# Patient Record
Sex: MALE | Race: OTHER | Employment: OTHER | URBAN - METROPOLITAN AREA
[De-identification: names, ages, dates, MRNs, and addresses within clinical notes are randomized per-mention and may not be internally consistent; named-entity substitution may affect disease eponyms.]

---

## 2024-11-21 ENCOUNTER — APPOINTMENT (OUTPATIENT)
Dept: RADIOLOGY | Facility: CLINIC | Age: 59
End: 2024-11-21
Payer: COMMERCIAL

## 2024-11-21 VITALS
SYSTOLIC BLOOD PRESSURE: 117 MMHG | BODY MASS INDEX: 35.77 KG/M2 | HEIGHT: 68 IN | HEART RATE: 76 BPM | DIASTOLIC BLOOD PRESSURE: 77 MMHG | WEIGHT: 236 LBS

## 2024-11-21 DIAGNOSIS — Z01.89 ENCOUNTER FOR LOWER EXTREMITY COMPARISON IMAGING STUDY: ICD-10-CM

## 2024-11-21 DIAGNOSIS — M25.562 ACUTE PAIN OF LEFT KNEE: Primary | ICD-10-CM

## 2024-11-21 DIAGNOSIS — M17.12 PRIMARY OSTEOARTHRITIS OF LEFT KNEE: ICD-10-CM

## 2024-11-21 DIAGNOSIS — M25.562 LEFT KNEE PAIN, UNSPECIFIED CHRONICITY: ICD-10-CM

## 2024-11-21 PROCEDURE — 73564 X-RAY EXAM KNEE 4 OR MORE: CPT

## 2024-11-21 PROCEDURE — 73562 X-RAY EXAM OF KNEE 3: CPT

## 2024-11-21 PROCEDURE — 99203 OFFICE O/P NEW LOW 30 MIN: CPT | Performed by: ORTHOPAEDIC SURGERY

## 2024-11-21 PROCEDURE — 20610 DRAIN/INJ JOINT/BURSA W/O US: CPT | Performed by: ORTHOPAEDIC SURGERY

## 2024-11-21 RX ORDER — SEMAGLUTIDE 2.68 MG/ML
INJECTION, SOLUTION SUBCUTANEOUS
COMMUNITY
Start: 2024-11-12

## 2024-11-21 RX ORDER — ATORVASTATIN CALCIUM 20 MG/1
1 TABLET, FILM COATED ORAL DAILY
COMMUNITY
Start: 2024-09-12

## 2024-11-21 RX ORDER — EMPAGLIFLOZIN 10 MG/1
1 TABLET, FILM COATED ORAL DAILY
COMMUNITY
Start: 2024-09-12

## 2024-11-21 RX ORDER — FLASH GLUCOSE SENSOR
KIT MISCELLANEOUS
COMMUNITY
Start: 2024-10-24

## 2024-11-21 RX ORDER — LISINOPRIL 5 MG/1
1 TABLET ORAL DAILY
COMMUNITY
Start: 2024-09-12

## 2024-11-21 RX ORDER — INSULIN DEGLUDEC 100 U/ML
25 INJECTION, SOLUTION SUBCUTANEOUS
COMMUNITY

## 2024-11-21 RX ADMIN — TRIAMCINOLONE ACETONIDE 40 MG: 40 INJECTION, SUSPENSION INTRA-ARTICULAR; INTRAMUSCULAR at 10:30

## 2024-11-21 RX ADMIN — ROPIVACAINE HYDROCHLORIDE 4 ML: 2 INJECTION, SOLUTION EPIDURAL; INFILTRATION; PERINEURAL at 10:30

## 2024-11-21 NOTE — PROGRESS NOTES
Ortho Sports Medicine New Patient Visit     Assesment:   59 y.o. male with left knee osteoarthritis    Plan:      We had a long discussion regarding his left knee degenerative joint disease including options for treatment.  I recommended starting with conservative management options.  I provided a referral to physical therapy as I do believe this will help with strength and mobility, and may improved symptoms significantly.   We discussed options for injections including corticosteroids, viscosupplementation, and biologics including risks and benefits of each. Patient consented to a left knee aspiration and corticosteroid injection, procedure tolerated well and outlined below. Post injection protocol advised. He can continue with his compression sleeve and OTC analgesics as needed.         Follow up:    Return if symptoms worsen or fail to improve.        Chief Complaint   Patient presents with    Left Knee - Pain       History of Present Illness:    The patient is a 59 y.o. male who presents for initial evaluation of left knee pain. Patient notes history of bilateral knee arthroscopies and meniscal repair 1999/2000. Acutely he denies injury or trauma but notes significant swelling and moderate pain to the knee. Pain is localized to the knee anteriorly and distal quad. He has been utilizing anti-inflammatories and a compression sleeve with benefit. He denies instability or weakness. Denies numbness or paresthesias.       Knee Surgical History:  2000 - bilateral knee meniscus repair     Past Medical, Social and Family History:  Past Medical History:   Diagnosis Date    Diabetes mellitus (HCC)      Past Surgical History:   Procedure Laterality Date    KNEE SURGERY Bilateral     meniscus    UMBILICAL HERNIA REPAIR  2011    removal     No Known Allergies  Current Outpatient Medications on File Prior to Visit   Medication Sig Dispense Refill    atorvastatin (LIPITOR) 20 mg tablet Take 1 tablet by mouth daily       Continuous Glucose Sensor (FreeStyle Jess 14 Day Sensor) MISC USE 1 SENSOR EVERY 2 WEEKS      Insulin Degludec 100 UNIT/ML SOLN Inject 25 Units under the skin      Jardiance 10 MG TABS tablet Take 1 tablet by mouth daily      lisinopril (ZESTRIL) 5 mg tablet Take 1 tablet by mouth daily      metFORMIN (GLUCOPHAGE) 500 mg tablet metformin 500 mg tablet      Ozempic, 2 MG/DOSE, 8 MG/3ML injection pen Inject under the skin       No current facility-administered medications on file prior to visit.     Social History     Socioeconomic History    Marital status: /Civil Union     Spouse name: Not on file    Number of children: Not on file    Years of education: Not on file    Highest education level: Not on file   Occupational History    Not on file   Tobacco Use    Smoking status: Never    Smokeless tobacco: Never   Vaping Use    Vaping status: Never Used   Substance and Sexual Activity    Alcohol use: Never    Drug use: Never    Sexual activity: Not on file   Other Topics Concern    Not on file   Social History Narrative    Not on file     Social Drivers of Health     Financial Resource Strain: Not on file   Food Insecurity: Not on file   Transportation Needs: Not on file   Physical Activity: Not on file   Stress: Not on file   Social Connections: Not on file   Intimate Partner Violence: Not on file   Housing Stability: Not on file         I have reviewed the past medical, surgical, social and family history, medications and allergies as documented in the EMR.    Review of systems: ROS is negative other than that noted in the HPI.  Constitutional: Negative for fatigue and fever.   HENT: Negative for sore throat.    Respiratory: Negative for shortness of breath.    Cardiovascular: Negative for chest pain.   Gastrointestinal: Negative for abdominal pain.   Endocrine: Negative for cold intolerance and heat intolerance.   Genitourinary: Negative for flank pain.   Musculoskeletal: Negative for back pain.   Skin:  "Negative for rash.   Allergic/Immunologic: Negative for immunocompromised state.   Neurological: Negative for dizziness.   Psychiatric/Behavioral: Negative for agitation.      Physical Exam:    Blood pressure 117/77, pulse 76, height 5' 8.39\" (1.737 m), weight 107 kg (236 lb).    General/Constitutional: NAD, well developed, well nourished  HENT: Normocephalic, atraumatic  CV: Intact distal pulses, regular rate  Resp: No respiratory distress or labored breathing  Abdomen: soft, nondistended   Lymphatic: No lymphadenopathy palpated  Neuro: Alert and Oriented x 3, no focal deficits  Psych: Normal mood, normal affect  Skin: Warm, dry, no rashes, no erythema      Knee Exam:   No significant skin lesions or deformity  Range of motion from 0 to 120  Effusion to the knee  medial joint line tenderness   Knee is stable to varus stress, valgus stress, Lachman, and posterior drawer.    Neurovascularly intact distally    Knee Imaging    X-rays of the left knee reviewed and interpreted today. These show severe medial compartment osteoarthritis with joint space loss, subchondral sclerosis, and osteophytosis.      Large joint arthrocentesis: L knee  Universal Protocol:  procedure performed by consultantConsent: Verbal consent obtained.  Risks and benefits: risks, benefits and alternatives were discussed  Consent given by: patient  Timeout called at: 11/21/2024 11:09 AM.  Patient understanding: patient states understanding of the procedure being performed  Patient identity confirmed: verbally with patient  Supporting Documentation  Indications: pain and diagnostic evaluation   Procedure Details  Location: knee - L knee  Preparation: Patient was prepped and draped in the usual sterile fashion  Needle size: 22 G  Ultrasound guidance: no  Approach: anterolateral  Medications administered: 4 mL ropivacaine 0.2 %; 40 mg triamcinolone acetonide 40 mg/mL    Aspirate amount: 40 mL  Aspirate: clear and yellow  Patient tolerance: patient " tolerated the procedure well with no immediate complications  Dressing:  Sterile dressing applied            Scribe Attestation      I,:  Ambar Cage am acting as a scribe while in the presence of the attending physician.:       I,:  Erlin Sawyer MD personally performed the services described in this documentation    as scribed in my presence.:

## 2024-11-22 ENCOUNTER — EVALUATION (OUTPATIENT)
Dept: PHYSICAL THERAPY | Facility: CLINIC | Age: 59
End: 2024-11-22
Payer: COMMERCIAL

## 2024-11-22 DIAGNOSIS — M17.12 PRIMARY OSTEOARTHRITIS OF LEFT KNEE: ICD-10-CM

## 2024-11-22 DIAGNOSIS — M25.562 ACUTE PAIN OF LEFT KNEE: ICD-10-CM

## 2024-11-22 PROCEDURE — 97161 PT EVAL LOW COMPLEX 20 MIN: CPT

## 2024-11-22 NOTE — PROGRESS NOTES
PT Evaluation   Today's date: 2024  Patient name: Sascha Springer  : 1965  MRN: [id number]  Referring provider: Erlin Sawyer*  Dx:   Encounter Diagnosis     ICD-10-CM    1. Acute pain of left knee  M25.562 Ambulatory Referral to Physical Therapy      2. Primary osteoarthritis of left knee  M17.12 Ambulatory Referral to Physical Therapy            Assessment  Assessment details: Patient is a 59 y.o. Male who presents with referring diagnosis of L knee OA. Patient's greatest concern is the pain he is experiencing, worry over not knowing what's wrong, concern at no signs of improvement, and fear of not being able to keep active.    Primary movement impairment diagnosis of hypomobility w/ potential directional preference resulting in pathoanatomical symptoms of pain, decreased ROM, proprioception, power production, and function. The aforementioned impairments have limited the patient's ability to transfer, mobilize, tolerate activity, prolonged positions, and perform ADLs. No further referral appears necessary at this time based upon examination results    Patient education performed during today's session included: POC, prognosis    Primary movement impairments:  1) Hypomobility of L knee       Impairments: Abnormal or restricted ROM, Activity intolerance, Impaired physical strength, Lacks appropriate HEP, Poor posture, Poor body mechanics, and Pain with function  Understanding of Dx/Px/POC: Good  Prognosis: Good   Positive prognostic factors: Age, motivation to improve, acuity of symptoms    Patient verbalized understanding of POC.         Please contact me if you have any questions or recommendations. Thank you for the referral and the opportunity to share in Sascha Springer's care.        Plan  Patient would benefit from: Skilled PT  Planned modality interventions: Biofeedback  Planned therapy interventions: Abdominal trunk  stabilization, Balance/WB training, Body mechanics training, Dry Needling, Functional ROM exercises, HEP, Joint mobilization, Manual therapy, Motor coordination training, Neuromuscular re-education, Patient education, Postural training, Strengthening, Stretching, Therapeutic activities, and Therapeutic exercises  Frequency: 2x/week  Duration in weeks: 10-12 weeks  Plan of Care beginning date: 24  Plan of Care expiration date: 3 months - 2025  Treatment plan discussed with: Patient       Goals  Short Term Goals (1-3 weeks):    - Patient will be independent in basic HEP 2-3 weeks  - Patient will report >50% reduction in pain  - Patient will demonstrate >1/3 improvement in MMT grade as applicable  - Patient will be able to ambulate w/o greater than 4/10 pain w/ LRDN    Long Term Goals (6-8 weeks):  - Patient will be independent in a comprehensive home exercise program  - Patient FOTO score will improve to beyond goal score  - Patient will self-report >80% improvement in function  - Patient will be able to return to ADLs w/o limitation  - Pt will be able to walk for 15 min w/o limitations  - Pt will be able to stand for 45 min w/o limitations     Patient goals: Pt wants to be able to walk and stand for prolonged periods.     Subjective    History of Present Illness  - Mechanism of injury: Pt presents w/ pain in his L knee following his trip to [place]. It has been going on for 1.5 weeks. He has a history of meniscectomy. He saw the orthopedist yesterday where he was aspirated and given a cortisone shot. Pain is on the superior and medial aspect of his knee. Aggravating factors: Prolonged walking, lifting, prolonged positions, stairs. Relieving factors: NSAID, ice, and rest, cortisone shot. Pt likes to stay active.       - Primary AD: NA  - Assist level at home: Independent  - Prior level of function: Independent  - Occupation:     Pain  - Current pain rating: 3/10  - At best pain ratin/10  - At worst pain  ratin/10  - Location: Superior/medial aspect of knee  - Aggravating factors: Wb activity      Objective     Red Flag Screening  - Positive for: age >50 years old    - Negative for: history of cancer, fever, chills, night sweats, weight loss, recent infection, immunosuppression, rest/night pain, saddle anesthesia, bladder dysfunction, and LE neurological deficits      Sensation  - Light touch: Intact and symmetrical L1-S2    LE MMT  LEFT   RIGHT  -Hip Flexion:   4+/5  4+/5    -Knee Flexion  4+/5  4+/5  -Knee Extension 4/5 P!  5/5    -Ankle DF  5/5  5/5  -Ankle PF    5/5  -Ankle Inversion   55  -Ankle Eversion   55    -Great Toe Extension   55      Lumbar Spine Range of Motion  Full range of motion with no pain or limitations in flexion, extension, lateral flexion and rotation      Knee Range of Motion    LEFT  RIGHT  - Flexion 120  135  - Extension -5  0   Pt demo's hypomobility in his L knee w/ increased joint restriction in ext.  Performed repeated ext of knee which demo'd improvements in his comparable sign (gait).    Functional Assessment  -Functional Squat: WNL w/ pain  -Gait Assessment: Antalgic w/ decreased step length and heel/toe off.                 Insurance Eval/ Re-eval POC expires Auth Status Total visits  Start date  Expiration date Misc          Co-Pay 50                 Date 24        Visit Number         Auth                  Manual                                                      TherEx         Repeated knee ext 3x10 in sitting                                                                       Neuro Re-Ed                                                                                 TherAct                                                      Gait Training                                    Modalities         CP                  Precautions: T2D  Past Medical History:   Diagnosis Date    Diabetes mellitus (HCC)

## 2024-11-26 RX ORDER — ROPIVACAINE HYDROCHLORIDE 2 MG/ML
4 INJECTION, SOLUTION EPIDURAL; INFILTRATION; PERINEURAL
Status: COMPLETED | OUTPATIENT
Start: 2024-11-21 | End: 2024-11-21

## 2024-11-26 RX ORDER — TRIAMCINOLONE ACETONIDE 40 MG/ML
40 INJECTION, SUSPENSION INTRA-ARTICULAR; INTRAMUSCULAR
Status: COMPLETED | OUTPATIENT
Start: 2024-11-21 | End: 2024-11-21